# Patient Record
Sex: FEMALE | Race: BLACK OR AFRICAN AMERICAN | NOT HISPANIC OR LATINO
[De-identification: names, ages, dates, MRNs, and addresses within clinical notes are randomized per-mention and may not be internally consistent; named-entity substitution may affect disease eponyms.]

---

## 2018-07-09 PROBLEM — Z00.00 ENCOUNTER FOR PREVENTIVE HEALTH EXAMINATION: Status: ACTIVE | Noted: 2018-07-09

## 2018-07-13 ENCOUNTER — APPOINTMENT (OUTPATIENT)
Dept: OTOLARYNGOLOGY | Facility: CLINIC | Age: 23
End: 2018-07-13
Payer: COMMERCIAL

## 2018-07-13 VITALS
WEIGHT: 150 LBS | HEART RATE: 71 BPM | DIASTOLIC BLOOD PRESSURE: 70 MMHG | BODY MASS INDEX: 25.3 KG/M2 | SYSTOLIC BLOOD PRESSURE: 123 MMHG | HEIGHT: 64.5 IN

## 2018-07-13 DIAGNOSIS — J45.909 UNSPECIFIED ASTHMA, UNCOMPLICATED: ICD-10-CM

## 2018-07-13 DIAGNOSIS — J03.91 ACUTE RECURRENT TONSILLITIS, UNSPECIFIED: ICD-10-CM

## 2018-07-13 PROCEDURE — 99204 OFFICE O/P NEW MOD 45 MIN: CPT

## 2018-07-16 PROBLEM — J45.909 ASTHMA: Status: ACTIVE | Noted: 2018-07-13

## 2018-07-16 PROBLEM — J03.91 RECURRENT TONSILLITIS: Status: ACTIVE | Noted: 2018-07-16

## 2019-04-30 ENCOUNTER — APPOINTMENT (OUTPATIENT)
Dept: OTOLARYNGOLOGY | Facility: CLINIC | Age: 24
End: 2019-04-30
Payer: COMMERCIAL

## 2019-04-30 DIAGNOSIS — H61.22 IMPACTED CERUMEN, LEFT EAR: ICD-10-CM

## 2019-04-30 DIAGNOSIS — H92.02 OTALGIA, LEFT EAR: ICD-10-CM

## 2019-04-30 PROCEDURE — 69210 REMOVE IMPACTED EAR WAX UNI: CPT | Mod: LT

## 2019-04-30 PROCEDURE — 99214 OFFICE O/P EST MOD 30 MIN: CPT | Mod: 25

## 2019-04-30 NOTE — CONSULT LETTER
[Dear  ___] : Dear  [unfilled], [Courtesy Letter:] : I had the pleasure of seeing your patient, [unfilled], in my office today. [Consult Closing:] : Thank you very much for allowing me to participate in the care of this patient.  If you have any questions, please do not hesitate to contact me. [Sincerely,] : Sincerely, [FreeTextEntry3] : Lexx Lu MD\par Department of Otolaryngology - Head and Neck Surgery\par [facility]

## 2019-04-30 NOTE — ASSESSMENT
[FreeTextEntry1] : 23F here in followup. She thinks there is cerumen buildup and would like her ears checked and cleaned. There is no otorrhea, tinnitus or vertigo. She is also c/o pain around her left ear over the past few days.\par Since last seen 7/2018, she has done very well. She saw the allergist and testing revealed multiple allergies; she takes zyrtec as needed and has not had any further episodes of face/lip swelling. There have also been no further episodes of neck swelling/salivary stones and she has not had a tonsil infection either since last seen.\par On exam, mild cerumen was removed form the left EAC. The rest of the complete and comprehensive head and neck exam is unremarkable - the neck is soft, the TMJs are nontender,\par Unclear etiology to the left ear pain, but given normal exam, is likely either musculoskeletal or TMJ-related; pt was reassured either way. Recommend NSAIDs for pain. RTO 1 month should the pain persist/worsen; otherwise as needed.

## 2019-04-30 NOTE — HISTORY OF PRESENT ILLNESS
[de-identified] : 23F here in followup.\par \par She thinks there is cerumen buildup and would like her ears checked and cleaned. There is no otorrhea, tinnitus or vertigo. She is also c/o pain around her left ear over the past few days.\par Since last seen 7/2018, she has done very well. She saw the allergist and testing revealed multiple allergies; she takes zyrtec as needed and has not had any further episodes of face/lip swelling. There have also been no further episodes of neck swelling/salivary stones, and she has not had a tonsil infection either.\par \par ROS otherwise unremarkable.

## 2019-04-30 NOTE — PHYSICAL EXAM
[de-identified] : nontender over left TMJ and pushing left anterior EAC wall [de-identified] : no masses/lesions, no gross adenopathy; neck soft, supple [FreeTextEntry1] : Ad: ext ear wnl, eac clear and dry, TM intact and mobile, ME clear\par As: ext ear wnl, eac w cerumen along the floor removed w angled curet, TM intact and mobile, ME clear\par  [de-identified] : hypertrophic turbinates [Midline] : trachea located in midline position [de-identified] : FOM soft, submandibular ducts patent w clear saliva [de-identified] : 2-3+ cryptic tonsils; no stones or exudate [Normal] : orientation to person, place, and time: normal

## 2020-04-03 ENCOUNTER — APPOINTMENT (OUTPATIENT)
Dept: OTOLARYNGOLOGY | Facility: CLINIC | Age: 25
End: 2020-04-03

## 2020-07-14 ENCOUNTER — APPOINTMENT (OUTPATIENT)
Dept: OTOLARYNGOLOGY | Facility: CLINIC | Age: 25
End: 2020-07-14
Payer: MEDICAID

## 2020-07-14 VITALS
BODY MASS INDEX: 25.97 KG/M2 | TEMPERATURE: 98 F | DIASTOLIC BLOOD PRESSURE: 70 MMHG | WEIGHT: 154 LBS | HEIGHT: 64.5 IN | HEART RATE: 70 BPM | SYSTOLIC BLOOD PRESSURE: 119 MMHG

## 2020-07-14 DIAGNOSIS — H92.01 OTALGIA, RIGHT EAR: ICD-10-CM

## 2020-07-14 DIAGNOSIS — R07.0 PAIN IN THROAT: ICD-10-CM

## 2020-07-14 PROCEDURE — 31575 DIAGNOSTIC LARYNGOSCOPY: CPT

## 2020-07-14 PROCEDURE — 99214 OFFICE O/P EST MOD 30 MIN: CPT | Mod: 25

## 2020-07-14 NOTE — CONSULT LETTER
[Courtesy Letter:] : I had the pleasure of seeing your patient, [unfilled], in my office today. [Dear  ___] : Dear  [unfilled], [Consult Closing:] : Thank you very much for allowing me to participate in the care of this patient.  If you have any questions, please do not hesitate to contact me. [FreeTextEntry3] : Lexx Lu MD\par Department of Otolaryngology - Head and Neck Surgery\par [facility] [Sincerely,] : Sincerely,

## 2020-07-14 NOTE — PROCEDURE
[FreeTextEntry3] : Fiberoptic Laryngoscopy:\par small adenoid tuft\par upper airway widely patent\par TVF symmetric and mobile\par mild postcricoid edema\par no masses/lesions

## 2020-07-14 NOTE — HISTORY OF PRESENT ILLNESS
[de-identified] : 24F here in followup.\par \par She c/o right sided ear discomfort, in addition to nose/throat discomfort which she feels causes dryness and affects her swallowing. The nose/throat sx are only at night and prevent her from falling asleep; they are not present during the day. There is no otorrhea, tinnitus, vertigo, ear popping/pressure or hearing loss. When the ear discomfort started she was told it was due to wisdom teeth which she had extracted, but the pain persisted unchanged.\par There is no voice change, coughing, throat clearing; there is no difficulty eating, breathing, swallowing or talking.\par She takes occasional zyrtec for allergies; she eats spicy and fried foods.\par \par ROS otherwise unremarkable.

## 2020-07-14 NOTE — ASSESSMENT
[FreeTextEntry1] : 24F here in followup. She c/o right sided ear discomfort, in addition to nose/throat discomfort which she feels causes dryness and affects her swallowing. The nose/throat sx are only at night and prevent her from falling asleep; they are not present during the day. There is no otorrhea, tinnitus, vertigo, ear popping/pressure or hearing loss. When the ear discomfort started she was told it was due to wisdom teeth which she had extracted, but the pain persisted unchanged. There is no voice change, coughing, throat clearing; there is no difficulty eating, breathing, swallowing or talking. She takes occasional zyrtec for allergies; she eats spicy and fried foods.\par On exam, the left tonsil is larger than the right, but otherwise appears normal. There is also mild boggy postcricoid edema on laryngoscopy. The rest of the complete and comprehensive head and neck exam is unremarkable.\par Unclear etiology to the right ear pain and nose/throat sx. Regarding the ear, I would favor her pain to be either musculoskeletal or TMJ-related and recommend NSAIDs and conservative management. Regarding the nose/throat sx at night, this could be reflux related - as such, went over strict diet/lifestyle change. Will hold off on PPIs. RTO 2-3 months to reevaluate the asymptomatic left tonsillar asymmetry. All questions answered.

## 2020-07-14 NOTE — PHYSICAL EXAM
[de-identified] : no masses/lesions, no gross adenopathy; neck soft, supple [de-identified] : nontender over left TMJ and pushing left anterior EAC wall [de-identified] : hypertrophic turbinates [Laryngoscopy Performed] : laryngoscopy was performed, see procedure section for findings [Midline] : trachea located in midline position [de-identified] : 2+ cryptic tonsils; left tonsil slightly larger w extension inferomedially, mucosalized and cryptic, no stones or exudate [de-identified] : FOM soft, submandibular ducts patent w clear saliva [Normal] : no rashes [FreeTextEntry1] : Ad: ext ear wnl, eac clear and dry, TM intact and mobile, ME clear\par As: ext ear wnl, eac w cerumen along the floor removed w angled curet, TM intact and mobile, ME clear\par

## 2020-09-15 ENCOUNTER — APPOINTMENT (OUTPATIENT)
Dept: OTOLARYNGOLOGY | Facility: CLINIC | Age: 25
End: 2020-09-15

## 2021-11-24 ENCOUNTER — APPOINTMENT (OUTPATIENT)
Dept: NEUROLOGY | Facility: CLINIC | Age: 26
End: 2021-11-24
Payer: MEDICAID

## 2021-11-24 ENCOUNTER — NON-APPOINTMENT (OUTPATIENT)
Age: 26
End: 2021-11-24

## 2021-11-24 VITALS
TEMPERATURE: 97.9 F | DIASTOLIC BLOOD PRESSURE: 79 MMHG | BODY MASS INDEX: 25.3 KG/M2 | HEART RATE: 68 BPM | HEIGHT: 64.5 IN | RESPIRATION RATE: 16 BRPM | SYSTOLIC BLOOD PRESSURE: 126 MMHG | WEIGHT: 150 LBS | OXYGEN SATURATION: 99 %

## 2021-11-24 DIAGNOSIS — M26.609 UNSPECIFIED TEMPOROMANDIBULAR JOINT DISORDER: ICD-10-CM

## 2021-11-24 DIAGNOSIS — G43.909 MIGRAINE, UNSPECIFIED, NOT INTRACTABLE, W/OUT STATUS MIGRAINOSUS: ICD-10-CM

## 2021-11-24 PROCEDURE — 99203 OFFICE O/P NEW LOW 30 MIN: CPT

## 2021-11-24 RX ORDER — ALBUTEROL 90 MCG
AEROSOL (GRAM) INHALATION
Refills: 0 | Status: ACTIVE | COMMUNITY

## 2021-11-24 NOTE — ASSESSMENT
[FreeTextEntry1] : Pt is 26yoF with PMH asthma here for initial visit for daily migraines and intermittent R facial numbness in the setting of severe R sided TMJ which has been flaring x8 months. My sense is that migraines are being triggered by her TMJ, and numbness is 2/2 muscle spasms from her TMJ. However given that facial numbness is a new feature for her, still need to r/o any intracranial pathology that may be contributing (although my suspicion for this is low). \par \par Plan: \par MRI head\par Pt will send previous brain imaging that was done 2 years ago \par She will send recent BW results\par WIll start her on nortriptiline for both migraine PPx as well as TMJ treatment. Discussed possible SEs to look out for and to noitify me if any. \par Will rx ubrelvy for severe migraine breakthorugh. Advised to take at start of her headache. \par Continue to monitor headaches, advised to keep a headache diary to better understand headache patterns. This should include details such as alleviating and precipitating factors, associated symptoms, frequency and severity, etc. Advised patient to avoid OTC (Tylenol/ Motrin) overuse as this can cause rebound migraines. \par \par F/u with attending in 2 months

## 2021-11-24 NOTE — HISTORY OF PRESENT ILLNESS
[FreeTextEntry1] : Pt is a 26yoF with PMH asthma here for initial evaluation of headaches. \par \par C/o constant daily headaches x 8 months. These headaches wax and wane, but are constant. She describes pain as aching and pressure, always localized to R side of her head. Headaches become severe every few days or so, reaching 10/10 on pain scale. She has tried Tylenol, Motrin, other OTCs with no relief. \par Endorsed hx of migraines which began as a teenager, these were associated with her periods/ hormone changes. \par Had extensive brain imaging ordered by outside neurologist when she was initially diagnosed with TMJ 2 years ago- all reportedly normal. Has been following up with multiple ENTs for severe R-sided TMJ pain. Has tried mouth guard with no relief. Has seen an oral surgeon and had wisdom teeth removed with no improvement in her pain. Has tried mouth guard, also with no relief. Was told she needs to followup with a maxillofacial surgeon which she has not yet done. She also reports new intermittent R sided facial numbness, also x8 months. She says this correlates with severe TMJ pain and not necessarily with her severe headaches. This numbness is a new feature for her. Denies any currently. She also denies any other specific neurological complaints including dizziness, tinnitus, vision changes, speech impairment, focal or generalized weakness/ gait disturbance. \par  \par Takes albuterol for asthma, not on any other medications including birth control or any supplements \par She denies pregnancy\par She denies personal or family history of any bleeding or clotting disorders.\par Denies hx of miscarriage. \par Denies hx of any eating disorders\par Denies hx of anxiety/ depression/ other psychiatric disorders (although she says she is under a fair amount of stress from work). \par She denies any cardiac history \par Denies hx smoking/ ETOH/ recreational drug use \par Had BW with PCP a few weeks ago, which was reportedly normal

## 2021-12-01 RX ORDER — UBROGEPANT 50 MG/1
50 TABLET ORAL
Qty: 16 | Refills: 0 | Status: DISCONTINUED | COMMUNITY
Start: 2021-11-24 | End: 2021-12-01

## 2021-12-12 ENCOUNTER — APPOINTMENT (OUTPATIENT)
Dept: MRI IMAGING | Facility: HOSPITAL | Age: 26
End: 2021-12-12

## 2021-12-12 ENCOUNTER — OUTPATIENT (OUTPATIENT)
Dept: OUTPATIENT SERVICES | Facility: HOSPITAL | Age: 26
LOS: 1 days | End: 2021-12-12
Payer: MEDICAID

## 2021-12-12 PROCEDURE — 70551 MRI BRAIN STEM W/O DYE: CPT | Mod: 26

## 2021-12-12 PROCEDURE — 70551 MRI BRAIN STEM W/O DYE: CPT

## 2021-12-13 ENCOUNTER — NON-APPOINTMENT (OUTPATIENT)
Age: 26
End: 2021-12-13

## 2021-12-14 ENCOUNTER — NON-APPOINTMENT (OUTPATIENT)
Age: 26
End: 2021-12-14

## 2021-12-19 ENCOUNTER — NON-APPOINTMENT (OUTPATIENT)
Age: 26
End: 2021-12-19

## 2022-07-22 ENCOUNTER — APPOINTMENT (OUTPATIENT)
Dept: OTOLARYNGOLOGY | Facility: CLINIC | Age: 27
End: 2022-07-22

## 2022-07-22 VITALS
BODY MASS INDEX: 26.14 KG/M2 | HEART RATE: 83 BPM | DIASTOLIC BLOOD PRESSURE: 95 MMHG | WEIGHT: 155 LBS | HEIGHT: 64.5 IN | SYSTOLIC BLOOD PRESSURE: 119 MMHG | TEMPERATURE: 97.8 F

## 2022-07-22 DIAGNOSIS — R22.1 LOCALIZED SWELLING, MASS AND LUMP, HEAD: ICD-10-CM

## 2022-07-22 DIAGNOSIS — R22.0 LOCALIZED SWELLING, MASS AND LUMP, HEAD: ICD-10-CM

## 2022-07-22 PROCEDURE — 99214 OFFICE O/P EST MOD 30 MIN: CPT

## 2022-07-22 NOTE — ASSESSMENT
[FreeTextEntry1] : 26F here in followup. Over the past year or so, she c/o recurrent episodes of swelling in her neck below her right jaw. The swelling can fluctuate. There is also right ear pain which can travel down her jaw and is more pronounced when her neck is swollen. There is no difficulty eating, breathing, swallowing or talking. Of note, she has h/o salivary stones which had presented as lump below her tongue a few years ago. She had been seen in the past for reflux, TMJ and tonsillar hypertrophy.\par On exam, there is an enlarged right SMG, nontender. There is pain on palpation over the right TMJ, ramus and anterior EAC wall. Submandibular ducts are patent w clear saliva. Tonsils are cryptic and the left tonsil is slightly larger w a small left stone. The rest of the head and neck exam is unremarkable.\par -Enlarged right SMG w h/o sialolithiasis -> will send for CT neck to evaluate for presence of stones/mass/lesion. Hydration, massage.\par -TMJ/myofascial pain -> soft diet, NSAIDs, \par RTO after CT for review.

## 2022-07-22 NOTE — HISTORY OF PRESENT ILLNESS
[de-identified] : 26F here in followup.\par \par Over the past year or so, she c/o recurrent episodes of swelling in her neck below her right jaw. The swelling can fluctuate. There is also right ear pain which can travel down her jaw and is more pronounced when her neck is swollen. There is no difficulty eating, breathing, swallowing or talking.\par Of note, she has h/o salivary stones which had presented as lump below her tongue a few years ago. \par She had been seen in the past for reflux, TMJ and tonsillar hypertrophy.\par \par ROS otherwise unremarkable.

## 2022-07-22 NOTE — PHYSICAL EXAM
[de-identified] : mildly enlarged right SMG, nontender. [de-identified] : +pain over right TMJ, ramus and anterior EAC wall [de-identified] : hypertrophic turbinates [Midline] : trachea located in midline position [Laryngoscopy Performed] : laryngoscopy was performed, see procedure section for findings [de-identified] : FOM soft, submandibular ducts patent w clear saliva [de-identified] : 2+ cryptic tonsils; left tonsil slightly larger w extension inferomedially, mucosalized and cryptic, small left stone [Normal] : no rashes

## 2022-07-31 ENCOUNTER — APPOINTMENT (OUTPATIENT)
Dept: CT IMAGING | Facility: HOSPITAL | Age: 27
End: 2022-07-31

## 2022-07-31 ENCOUNTER — OUTPATIENT (OUTPATIENT)
Dept: OUTPATIENT SERVICES | Facility: HOSPITAL | Age: 27
LOS: 1 days | End: 2022-07-31
Payer: MEDICAID

## 2022-07-31 PROCEDURE — 70491 CT SOFT TISSUE NECK W/DYE: CPT | Mod: 26

## 2022-07-31 PROCEDURE — 70491 CT SOFT TISSUE NECK W/DYE: CPT

## 2022-08-16 ENCOUNTER — APPOINTMENT (OUTPATIENT)
Dept: OTOLARYNGOLOGY | Facility: CLINIC | Age: 27
End: 2022-08-16

## 2022-08-16 DIAGNOSIS — K11.5 SIALOLITHIASIS: ICD-10-CM

## 2022-08-16 PROCEDURE — 99213 OFFICE O/P EST LOW 20 MIN: CPT | Mod: 25

## 2022-08-16 PROCEDURE — 42330 REMOVAL OF SALIVARY STONE: CPT

## 2022-08-16 NOTE — PHYSICAL EXAM
[Midline] : trachea located in midline position [Laryngoscopy Performed] : laryngoscopy was performed, see procedure section for findings [Normal] : no rashes [de-identified] : mildly enlarged right SMG, nontender. [de-identified] : +pain over right TMJ, ramus and anterior EAC wall [de-identified] : hypertrophic turbinates [de-identified] : FOM soft, no grossly palpable stone, left SMD patent [de-identified] : 2+ cryptic tonsils; left tonsil slightly larger w extension inferomedially, mucosalized and cryptic, small left stone

## 2022-08-16 NOTE — HISTORY OF PRESENT ILLNESS
[de-identified] : 26F here in followup.\par \par Over the past year or so, she c/o recurrent episodes of swelling in her neck below her right jaw. The swelling can fluctuate. There is also right ear pain which can travel down her jaw and is more pronounced when her neck is swollen. There is no difficulty eating, breathing, swallowing or talking.\par Of note, she has h/o salivary stones which had presented as lump below her tongue a few years ago. \par She had been seen in the past for reflux, TMJ and tonsillar hypertrophy.\par \par CT Neck w/o contrast 7/31/22:\par -5 mm calculus is identified in the distal right submandibular duct\par \par ROS otherwise unremarkable.

## 2022-08-16 NOTE — ASSESSMENT
[FreeTextEntry1] : 26F here in followup. Over the past year or so, she c/o recurrent episodes of swelling in her neck below her right jaw. The swelling can fluctuate. There is also right ear pain which can travel down her jaw and is more pronounced when her neck is swollen. There is no difficulty eating, breathing, swallowing or talking. Of note, she has h/o salivary stones which had presented as lump below her tongue a few years ago. CT neck demonstrates a 5mm calculus in the distal right submandibular duct. On exam, the floor of mouth is soft; there is no gross saliva on pressure from the right SMD. The right distal SMD was marsupialized into the oral cavity without issue w egress of clear saliva.\par Right submandibular sialolithiasis now s/p in office right SMD sialodochoplasty. No stone was extracted, but the duct was opened widely and hopefully will help. RTO 3-4 weeks in routine followup. If sx persist, will need to go to OR.\par Will also send to Dr. Carlos for TMJ evaluation; she reports she was scheduled for jaw arthroscopy? but would like second opinion.

## 2022-08-16 NOTE — PROCEDURE
[FreeTextEntry3] : Sialodochoplasty:\par floor of mouth infiltrated with 1% lido w epi\par right submandibular duct cannulated w lacrimal probes from 000 to 4\par distal 7mm of ductal marsupialized w 11blade and scissors --> egress of clear saliva on massage\par no stone extracted or palpated\par tolerated well

## 2022-08-23 ENCOUNTER — APPOINTMENT (OUTPATIENT)
Dept: OTOLARYNGOLOGY | Facility: CLINIC | Age: 27
End: 2022-08-23

## 2022-08-23 VITALS
DIASTOLIC BLOOD PRESSURE: 81 MMHG | SYSTOLIC BLOOD PRESSURE: 115 MMHG | WEIGHT: 155 LBS | TEMPERATURE: 98.2 F | BODY MASS INDEX: 25.83 KG/M2 | HEIGHT: 65 IN | HEART RATE: 78 BPM

## 2022-08-23 PROCEDURE — 99214 OFFICE O/P EST MOD 30 MIN: CPT | Mod: 25

## 2022-08-23 NOTE — PHYSICAL EXAM
[de-identified] : Right-sided mild neck pain in the submandibular fossa and floor for floor of mouth bimanual palpation reveals not only the palpable stone but some pain in the area [de-identified] : As per HPI [de-identified] : Temporomandibular joint exam reveals no significant deviation on opening no significant pain to palpation of the joint capsule on either side no significant clicking and popping she does have some very mild asymmetry when opening and significant tension in the masseter muscle on the right-hand side with pain on palpation of the masseter anterior belly.  Temporalis muscle relatively normal bilaterally on exam. [de-identified] : No evidence of effusion or drainage [de-identified] : Floor mouth anteriorly there is a very small amount of inflammation of the opening of Lynn's duct [de-identified] : Occlusion intact no evidence of right-sided open bite [Normal] : orientation to person, place, and time: normal

## 2022-08-23 NOTE — DATA REVIEWED
[de-identified] : I have reviewed her CT scan and I can see the visible stone on the right side within the duct the remainder of the submandibular gland appears normal the remainder of the CT appears normal I evaluated the osseous structures of the TMJs bilaterally and they are relatively normal with minimal differences between the sides.  I also reviewed her MRI scan and while she does appear to have very mild disc changes on the right with some evidence of lack of recapture in the disc certainly there is not severe disease severe swelling inflammation or evidence of disc perforation on the scan.  No other soft tissue abnormalities noted.

## 2022-08-23 NOTE — HISTORY OF PRESENT ILLNESS
[de-identified] : 26F presents with right sided facial/jaw pain for several years. Also reports swollen lymph nodes. \par Referred by Dr. Lu for sinus issues and had CT Neck done which showed a calculus in submandibular gland.  Patient reports a several month history of right-sided facial pain she is never had pain on the left she has CT which was performed which reveals a approximately 4 to 6 mm stone in the right Stensen's duct somewhat anteriorly.  In addition she was evaluated for possible TMJ disease on the right-hand side due to some facial pain and under went an MRI of the TMJ which showed some possible trapping of the meniscus on the right disc. [FreeTextEntry1] : She reports no fever chills nausea vomiting or significant cellulitic changes in the neck

## 2022-09-06 ENCOUNTER — TRANSCRIPTION ENCOUNTER (OUTPATIENT)
Age: 27
End: 2022-09-06

## 2022-09-06 VITALS
DIASTOLIC BLOOD PRESSURE: 79 MMHG | OXYGEN SATURATION: 100 % | WEIGHT: 157.19 LBS | HEART RATE: 76 BPM | HEIGHT: 65 IN | RESPIRATION RATE: 16 BRPM | SYSTOLIC BLOOD PRESSURE: 113 MMHG | TEMPERATURE: 98 F

## 2022-09-06 NOTE — PRE-OP CHECKLIST - SELECT TESTS ORDERED
CBC/CMP/PT/PTT/INR/Urinalysis/HCG/COVID-19 Preg test negative-DOS/CBC/CMP/PT/PTT/INR/Urinalysis/HCG/COVID-19 Double O-Z Plasty Text: The defect edges were debeveled with a #15 scalpel blade.  Given the location of the defect, shape of the defect and the proximity to free margins a Double O-Z plasty (double transposition flap) was deemed most appropriate.  Using a sterile surgical marker, the appropriate transposition flaps were drawn incorporating the defect and placing the expected incisions within the relaxed skin tension lines where possible. The area thus outlined was incised deep to adipose tissue with a #15 scalpel blade.  The skin margins were undermined to an appropriate distance in all directions utilizing iris scissors.  Hemostasis was achieved with electrocautery.  The flaps were then transposed into place, one clockwise and the other counterclockwise, and anchored with interrupted buried subcutaneous sutures.

## 2022-09-06 NOTE — ASU PATIENT PROFILE, ADULT - NSICDXPASTMEDICALHX_GEN_ALL_CORE_FT
PAST MEDICAL HISTORY:  Asthma     Atopic dermatitis     Iron deficiency anemia     Salivary calculus

## 2022-09-06 NOTE — ASU PATIENT PROFILE, ADULT - FALL HARM RISK - UNIVERSAL INTERVENTIONS
Bed in lowest position, wheels locked, appropriate side rails in place/Call bell, personal items and telephone in reach/Instruct patient to call for assistance before getting out of bed or chair/Non-slip footwear when patient is out of bed/[place] to call system/Physically safe environment - no spills, clutter or unnecessary equipment/Purposeful Proactive Rounding/Room/bathroom lighting operational, light cord in reach

## 2022-09-06 NOTE — ASU PATIENT PROFILE, ADULT - NSICDXPASTSURGICALHX_GEN_ALL_CORE_FT
PAST SURGICAL HISTORY:  Elective surgery removal of salivary calculus     PAST SURGICAL HISTORY:  H/O endoscopy

## 2022-09-07 ENCOUNTER — APPOINTMENT (OUTPATIENT)
Dept: OTOLARYNGOLOGY | Facility: HOSPITAL | Age: 27
End: 2022-09-07

## 2022-09-07 ENCOUNTER — TRANSCRIPTION ENCOUNTER (OUTPATIENT)
Age: 27
End: 2022-09-07

## 2022-09-07 ENCOUNTER — RESULT REVIEW (OUTPATIENT)
Age: 27
End: 2022-09-07

## 2022-09-07 ENCOUNTER — OUTPATIENT (OUTPATIENT)
Dept: OUTPATIENT SERVICES | Facility: HOSPITAL | Age: 27
LOS: 1 days | Discharge: HOME | End: 2022-09-07
Payer: MEDICAID

## 2022-09-07 VITALS
SYSTOLIC BLOOD PRESSURE: 124 MMHG | OXYGEN SATURATION: 100 % | DIASTOLIC BLOOD PRESSURE: 69 MMHG | RESPIRATION RATE: 18 BRPM | HEART RATE: 58 BPM

## 2022-09-07 DIAGNOSIS — Z41.9 ENCOUNTER FOR PROCEDURE FOR PURPOSES OTHER THAN REMEDYING HEALTH STATE, UNSPECIFIED: Chronic | ICD-10-CM

## 2022-09-07 DIAGNOSIS — Z98.890 OTHER SPECIFIED POSTPROCEDURAL STATES: Chronic | ICD-10-CM

## 2022-09-07 PROCEDURE — 42330 REMOVAL OF SALIVARY STONE: CPT

## 2022-09-07 PROCEDURE — 88300 SURGICAL PATH GROSS: CPT | Mod: 26

## 2022-09-07 PROCEDURE — 88300 SURGICAL PATH GROSS: CPT

## 2022-09-07 RX ORDER — KETOROLAC TROMETHAMINE 30 MG/ML
30 SYRINGE (ML) INJECTION ONCE
Refills: 0 | Status: DISCONTINUED | OUTPATIENT
Start: 2022-09-07 | End: 2022-09-07

## 2022-09-07 RX ORDER — EPINEPHRINE 0.3 MG/.3ML
0 INJECTION INTRAMUSCULAR; SUBCUTANEOUS
Qty: 0 | Refills: 0 | DISCHARGE

## 2022-09-07 RX ORDER — ALBUTEROL 90 UG/1
2 AEROSOL, METERED ORAL
Qty: 0 | Refills: 0 | DISCHARGE

## 2022-09-07 RX ORDER — CHLORHEXIDINE GLUCONATE 213 G/1000ML
15 SOLUTION TOPICAL
Qty: 315 | Refills: 0
Start: 2022-09-07 | End: 2022-09-13

## 2022-09-07 RX ORDER — SODIUM CHLORIDE 9 MG/ML
1000 INJECTION, SOLUTION INTRAVENOUS
Refills: 0 | Status: DISCONTINUED | OUTPATIENT
Start: 2022-09-07 | End: 2022-09-07

## 2022-09-07 RX ADMIN — Medication 30 MILLIGRAM(S): at 09:19

## 2022-09-07 NOTE — ASU DISCHARGE PLAN (ADULT/PEDIATRIC) - CARE PROVIDER_API CALL
Herbert Carlos (MD; DDS)  [facility]; OralMaxillofacial Surgery; Otolaryngology  [facility] - Dept of Otolaryngology, [address], [place] [address]  Phone: [phone]  Fax: [phone]  Established Patient  Follow Up Time:

## 2022-09-07 NOTE — BRIEF OPERATIVE NOTE - OPERATION/FINDINGS
Intraoral excision of R submandibular stone in distal duct. Submandibular duct marsupialized in FOM.

## 2022-09-07 NOTE — ASU DISCHARGE PLAN (ADULT/PEDIATRIC) - NS MD DC FALL RISK RISK
For information on Fall & Injury Prevention, visit: [email or web address] OR  [email or web address] OR  https://www.cdc.gov/steadi/patient.html

## 2022-09-12 PROBLEM — K11.5 SIALOLITHIASIS: Chronic | Status: ACTIVE | Noted: 2022-09-06

## 2022-09-12 PROBLEM — J45.909 UNSPECIFIED ASTHMA, UNCOMPLICATED: Chronic | Status: ACTIVE | Noted: 2022-09-06

## 2022-09-12 PROBLEM — L20.9 ATOPIC DERMATITIS, UNSPECIFIED: Chronic | Status: ACTIVE | Noted: 2022-09-06

## 2022-09-12 PROBLEM — D50.9 IRON DEFICIENCY ANEMIA, UNSPECIFIED: Chronic | Status: ACTIVE | Noted: 2022-09-06

## 2022-09-13 ENCOUNTER — APPOINTMENT (OUTPATIENT)
Dept: OTOLARYNGOLOGY | Facility: CLINIC | Age: 27
End: 2022-09-13

## 2022-09-14 LAB — SURGICAL PATHOLOGY STUDY: SIGNIFICANT CHANGE UP

## 2022-09-20 ENCOUNTER — APPOINTMENT (OUTPATIENT)
Dept: OTOLARYNGOLOGY | Facility: CLINIC | Age: 27
End: 2022-09-20

## 2022-09-20 VITALS
DIASTOLIC BLOOD PRESSURE: 90 MMHG | HEIGHT: 65 IN | WEIGHT: 155 LBS | BODY MASS INDEX: 25.83 KG/M2 | HEART RATE: 84 BPM | SYSTOLIC BLOOD PRESSURE: 129 MMHG | TEMPERATURE: 97.8 F

## 2022-09-20 PROCEDURE — 99024 POSTOP FOLLOW-UP VISIT: CPT

## 2022-10-16 ENCOUNTER — OUTPATIENT (OUTPATIENT)
Dept: OUTPATIENT SERVICES | Facility: HOSPITAL | Age: 27
LOS: 1 days | End: 2022-10-16
Payer: MEDICAID

## 2022-10-16 ENCOUNTER — APPOINTMENT (OUTPATIENT)
Dept: MRI IMAGING | Facility: HOSPITAL | Age: 27
End: 2022-10-16

## 2022-10-16 DIAGNOSIS — Z98.890 OTHER SPECIFIED POSTPROCEDURAL STATES: Chronic | ICD-10-CM

## 2022-10-16 PROCEDURE — 70543 MRI ORBT/FAC/NCK W/O &W/DYE: CPT | Mod: 26

## 2022-10-16 PROCEDURE — 70543 MRI ORBT/FAC/NCK W/O &W/DYE: CPT

## 2022-10-16 PROCEDURE — A9585: CPT

## 2022-12-06 ENCOUNTER — APPOINTMENT (OUTPATIENT)
Dept: OTOLARYNGOLOGY | Facility: CLINIC | Age: 27
End: 2022-12-06

## 2022-12-06 VITALS
BODY MASS INDEX: 25.83 KG/M2 | SYSTOLIC BLOOD PRESSURE: 109 MMHG | HEIGHT: 65 IN | DIASTOLIC BLOOD PRESSURE: 67 MMHG | WEIGHT: 155 LBS | TEMPERATURE: 97.7 F | HEART RATE: 80 BPM

## 2022-12-06 PROCEDURE — 99213 OFFICE O/P EST LOW 20 MIN: CPT

## 2022-12-08 NOTE — HISTORY OF PRESENT ILLNESS
[de-identified] : Patient presents for evaluation and a discussion regarding her right sided facial pain. She recently underwent MRI, and I have reviewed her MRI, and I do not see any lesions or suspicious findings which would explain his facial pain.  The pain seems to be centered on the right side primarily in the distribution of the trigeminal nerve including right ear canal right cheek and temple area, right mandibular teeth.  But no clear anatomic reason for this she said the pain for several years.

## 2022-12-08 NOTE — PHYSICAL EXAM
[de-identified] : Right-sided mild neck pain in the submandibular fossa and floor for floor of mouth bimanual palpation reveals not only the palpable stone but some pain in the area [de-identified] : As per HPI [de-identified] : Temporomandibular joint exam reveals no significant deviation on opening no significant pain to palpation of the joint capsule on either side no significant clicking and popping she does have some very mild asymmetry when opening and significant tension in the masseter muscle on the right-hand side with pain on palpation of the masseter anterior belly.  Temporalis muscle relatively normal bilaterally on exam. [de-identified] : No evidence of effusion or drainage [de-identified] : Occlusion intact no evidence of right-sided open bite [Normal] : orientation to person, place, and time: normal

## 2022-12-21 ENCOUNTER — APPOINTMENT (OUTPATIENT)
Dept: SPINE | Facility: CLINIC | Age: 27
End: 2022-12-21

## 2022-12-21 DIAGNOSIS — R51.9 HEADACHE, UNSPECIFIED: ICD-10-CM

## 2022-12-21 DIAGNOSIS — Z87.09 PERSONAL HISTORY OF OTHER DISEASES OF THE RESPIRATORY SYSTEM: ICD-10-CM

## 2022-12-21 PROCEDURE — 99203 OFFICE O/P NEW LOW 30 MIN: CPT | Mod: 95

## 2022-12-22 PROBLEM — R51.9 RIGHT FACIAL PAIN: Status: ACTIVE | Noted: 2022-12-22

## 2022-12-22 PROBLEM — Z87.09 HISTORY OF ASTHMA: Status: RESOLVED | Noted: 2022-12-22 | Resolved: 2022-12-22

## 2022-12-22 NOTE — PHYSICAL EXAM
[General Appearance - Alert] : alert [General Appearance - Well Nourished] : well nourished [General Appearance - Well-Appearing] : healthy appearing [Oriented To Time, Place, And Person] : oriented to person, place, and time [Person] : oriented to person [Place] : oriented to place [Time] : oriented to time [] : no respiratory distress

## 2023-01-05 NOTE — ADDENDUM
[FreeTextEntry1] : 2022\par \par Herbert Carlos M.D.\[facility]\par Department of Otolaryngology\par [address] [place] [address]\par [address]\[place] [address]\par \par Re:	Gail Maria \par :	[birth year]\par \par Dear Herbert,\par \par I saw Gail Maria in neurosurgical consultation.  As you know, she was having a multi-year history of pain on the right side of her face.  She underwent a salivary gland resection due to a stone but did not achieve any change in her symptoms.  She has pain that she describes in the temporal region, up about her eye, second division down to the third division as well.  She stated that for the past several years, when it began, it came on acutely.  There was no particular injury.  She was started on Tegretol but did not tolerate it.  It is unclear that any medication really solves her problem.  \par \par It does not sound like trigeminal neuralgia to me.  There is no specific inciting factor.  The pain is constant and involves all three divisions of the nerve.  \par \par I discussed this with her, since this was a telemedicine visit, no exam was done, but I explained to her that all of the procedures that I would be doing are destructive.  I do not think that there is a clinical basis to perform a destructive procedure on her, which would result in numbness and possibly create a worse situation for her.  I do not think it is unreasonable to try a trigeminal block temporarily but I do not think it would provide longstanding relief.  \par \par \par \par \par \par \par Unfortunately, I do not have much to add in this unfortunate case of facial pain of undetermined etiology.  I think further exploration of medical management, different combinations of medications, and other nondestructive treatments would be appropriate. \par \par \par \par \par Thank you very much for allowing me to see her.\par \par Sincerely,\par \par \par  \par Huber Hunt MD\par [facility] #[id number]\par \par \par

## 2023-01-05 NOTE — CONSULT LETTER
[FreeTextEntry2] : Herbert Carlos MD\par Dept of Otolaryngology, \par [address]\par  [address], [place] [address]

## 2023-01-05 NOTE — DATA REVIEWED
[de-identified] : ACC: [id number] EXAM: MR ORBIT FACE AND ORNECK WAWIC\par \par PROCEDURE DATE: 10/16/2022\par \par \par \par INTERPRETATION: The examination consists of sagittal, axial and coronal images through the brain obtained both before and following intravenous contrast administration, with particular attention to the course of cranial nerves III through VI. Series include post-contrast volumetric FLAIR and T1-weighted sequences, as well as heavily T2 weighted sequences.\par \par Clinical information: Right-sided atypical facial pain. Evaluate trigeminal nerve.\par \par The study is intermittently degraded by motion artifact and limited by suboptimal positioning.\par \par There is no evidence of mass or pathologic contrast enhancement along the course of the right trigeminal nerve from the prince through the cavernous sinuses/Meckel's caves through the facial soft tissues. There is no skull base mass. No neurovascular contact is identified within the cisternal segment. Included portions of the cervical spinal cord are unremarkable.\par \par The paranasal sinuses are fully ventilated bilaterally. The mastoid air cells are clear. The parotid glands are normal. There is mild prominence of adenoidal and left greater than right palatine tonsillar tissue without invasive features. The orbital contents are normal. Ventricles, sulci and cisterns are normal in caliber for the patient's age. Normal signal is identified in the brain parenchyma without intracranial mass or pathologic contrast enhancement.\par \par IMPRESSION:\par \par Exam is limited by motion and suboptimal positioning, however no etiology is identified for reported atypical right facial pain.\par \par --- End of Report ---\par \par \par \par \par \par JIMMIE PATEL MD; Attending Radiologist\par This document has been electronically signed. Oct 19 2022 3:29PM\par Notes\par

## 2023-01-05 NOTE — HISTORY OF PRESENT ILLNESS
[Home] : at home, [unfilled] , at the time of the visit. [Medical Office: ([facility])___] : at the medical office located in  [Verbal consent obtained from patient] : the patient, [unfilled] [de-identified] : This is a 28 y/o FEMALE who recently underwent the removal a RIGHT submandibular gland stone on 9/7/2022 for right sided facial pain but has continued  despite this procedure\par \par PLEASE REFER TO ADDENDUM FOR COMPLETE HPI

## 2023-01-06 ENCOUNTER — NON-APPOINTMENT (OUTPATIENT)
Age: 28
End: 2023-01-06

## 2023-01-06 ENCOUNTER — APPOINTMENT (OUTPATIENT)
Dept: NEUROLOGY | Facility: CLINIC | Age: 28
End: 2023-01-06
Payer: MEDICAID

## 2023-01-06 VITALS
HEART RATE: 76 BPM | TEMPERATURE: 97.9 F | OXYGEN SATURATION: 99 % | WEIGHT: 155 LBS | DIASTOLIC BLOOD PRESSURE: 84 MMHG | BODY MASS INDEX: 25.83 KG/M2 | SYSTOLIC BLOOD PRESSURE: 123 MMHG | HEIGHT: 65 IN

## 2023-01-06 PROCEDURE — 99214 OFFICE O/P EST MOD 30 MIN: CPT

## 2023-01-06 RX ORDER — RIMEGEPANT SULFATE 75 MG/75MG
75 TABLET, ORALLY DISINTEGRATING ORAL
Qty: 16 | Refills: 2 | Status: DISCONTINUED | COMMUNITY
Start: 2021-12-01 | End: 2023-01-06

## 2023-01-06 RX ORDER — NORTRIPTYLINE HYDROCHLORIDE 10 MG/1
10 CAPSULE ORAL AT BEDTIME
Qty: 50 | Refills: 0 | Status: DISCONTINUED | COMMUNITY
Start: 2021-11-24 | End: 2023-01-06

## 2023-01-09 NOTE — ASSESSMENT
[FreeTextEntry1] : Symptoms not consistent with trigeminal neuralgia, migraine, or tension headache\par She has seen multiple physicians of varying specialties and tried multiple treatments without improvement\par I recommended she see a facial pain specialist for evaluation and continued treatment- given referral\par \par

## 2023-01-09 NOTE — PHYSICAL EXAM
[FreeTextEntry1] : CN 2-12 normal\par Motor: 5/5 strength throughout\par Reflexes 2+ symmetric throughout\par Gait: normal

## 2023-01-09 NOTE — HISTORY OF PRESENT ILLNESS
[FreeTextEntry1] : 27yrs referred by Dr. Carlos for right facial pain\par \par She reported that she started experiencing symptoms since 2019. \par sudden onset, feels like dull and pressure, soreness to the area\par right face and sometime right posterior head feels warm , loud sounds are muffled \par associated fullness of the ear, jaw pain and tooth \par 10/10 and 8/10 \par tried tylenol, advil, \par seen a neurologist who thought it was trigeminal neuralgia however, prescribed carbamazepine which exacerbated symptoms\par She had botox for TMJ which did not help \par She also was given ubrelvy and nortriptyline which did not help \par \par Reviewed:\par Notes from neurology, neurosurgery, ENT\par MRI head and MRI face / orbits - normal

## 2023-06-05 ENCOUNTER — APPOINTMENT (OUTPATIENT)
Dept: NEUROLOGY | Facility: CLINIC | Age: 28
End: 2023-06-05

## 2023-08-27 ENCOUNTER — RESULT REVIEW (OUTPATIENT)
Age: 28
End: 2023-08-27

## 2023-08-27 ENCOUNTER — APPOINTMENT (OUTPATIENT)
Dept: MRI IMAGING | Facility: HOSPITAL | Age: 28
End: 2023-08-27

## 2023-08-27 ENCOUNTER — OUTPATIENT (OUTPATIENT)
Dept: OUTPATIENT SERVICES | Facility: HOSPITAL | Age: 28
LOS: 1 days | End: 2023-08-27
Payer: MEDICAID

## 2023-08-27 DIAGNOSIS — Z98.890 OTHER SPECIFIED POSTPROCEDURAL STATES: Chronic | ICD-10-CM

## 2023-08-27 PROCEDURE — A9585: CPT

## 2023-08-27 PROCEDURE — 70543 MRI ORBT/FAC/NCK W/O &W/DYE: CPT

## 2023-08-27 PROCEDURE — 70543 MRI ORBT/FAC/NCK W/O &W/DYE: CPT | Mod: 26

## 2024-01-04 NOTE — DATA REVIEWED
Pt attempting ice chips to encourage PO intake and PO med admin.    [No studies available for review at this time] : No studies available for review at this time

## 2024-01-22 ENCOUNTER — APPOINTMENT (OUTPATIENT)
Dept: NEUROSURGERY | Facility: CLINIC | Age: 29
End: 2024-01-22

## 2024-04-15 ENCOUNTER — APPOINTMENT (OUTPATIENT)
Dept: NEUROSURGERY | Facility: CLINIC | Age: 29
End: 2024-04-15
Payer: COMMERCIAL

## 2024-04-15 VITALS
HEART RATE: 77 BPM | OXYGEN SATURATION: 98 % | TEMPERATURE: 97.6 F | SYSTOLIC BLOOD PRESSURE: 119 MMHG | BODY MASS INDEX: 26.66 KG/M2 | HEIGHT: 65 IN | RESPIRATION RATE: 18 BRPM | DIASTOLIC BLOOD PRESSURE: 83 MMHG | WEIGHT: 160 LBS

## 2024-04-15 DIAGNOSIS — G50.1 ATYPICAL FACIAL PAIN: ICD-10-CM

## 2024-04-15 PROCEDURE — 99204 OFFICE O/P NEW MOD 45 MIN: CPT

## 2024-04-15 PROCEDURE — 99215 OFFICE O/P EST HI 40 MIN: CPT | Mod: NC

## 2024-04-15 RX ORDER — PREGABALIN 75 MG/1
75 CAPSULE ORAL
Refills: 0 | Status: ACTIVE | COMMUNITY

## 2024-04-15 NOTE — HISTORY OF PRESENT ILLNESS
[de-identified] : DMITRIY SIMON  is a 28 year old right handed female with PMH asthma, RIGHT side facial pain, migraines.  She reported that she started experiencing symptoms since 2019. sudden onset, feels like dull and pressure, soreness to the area right face and sometime right posterior head feels warm, loud sounds are muffled associated fullness of the ear, jaw pain and tooth,10/10 and 8/10 tried tylenol, advil, seen a neurologist who thought it was trigeminal neuralgia however, prescribed carbamazepine which exacerbated symptoms. She had botox for TMJ which did not help. She also was given ubrelvy 50mg and nortriptyline 10mg which did not help. Currently take pre-gablin 75mg 3x/day. Patient does not restrict her chewing based off her pain.   PCP/Specialist: Referred by: EMY:

## 2024-04-15 NOTE — PHYSICAL EXAM
[General Appearance - Alert] : alert [General Appearance - In No Acute Distress] : in no acute distress [Oriented To Time, Place, And Person] : oriented to person, place, and time [Impaired Insight] : insight and judgment were intact [Affect] : the affect was normal [Memory Recent] : recent memory was not impaired [Person] : oriented to person [Place] : oriented to place [Time] : oriented to time [Cranial Nerves Optic (II)] : visual acuity intact bilaterally,  pupils equal round and reactive to light [Cranial Nerves Oculomotor (III)] : extraocular motion intact [Cranial Nerves Trigeminal (V)] : facial sensation intact symmetrically [Cranial Nerves Facial (VII)] : face symmetrical [Cranial Nerves Vestibulocochlear (VIII)] : hearing was intact bilaterally [Cranial Nerves Glossopharyngeal (IX)] : tongue and palate midline [Cranial Nerves Accessory (XI - Cranial And Spinal)] : head turning and shoulder shrug symmetric [Cranial Nerves Hypoglossal (XII)] : there was no tongue deviation with protrusion [Motor Strength] : muscle strength was normal in all four extremities [] : no respiratory distress [Abnormal Walk] : normal gait [Over the Past 2 Weeks, Have You Felt Down, Depressed, or Hopeless?] : 1.) Over the past 2 weeks, have you felt down, depressed, or hopeless? No [Over the Past 2 Weeks, Have You Felt Little Interest or Pleasure Doing Things?] : 2.) Over the past 2 weeks, have you felt little interest or pleasure doing things? No [Limited Balance] : balance was intact [Past-pointing] : there was no past-pointing [Tremor] : no tremor present [Dysdiadochokinesia Bilaterally] : not present [Coordination - Dysmetria Impaired Heel-to-Shin Bilateral] : not present

## 2024-04-15 NOTE — ASSESSMENT
[FreeTextEntry1] :  My impression is that the patient suffers from atypical facial pain  I had a long discussion with the patient regarding being evaluated by colleage Dr. Pavon.  The patient was extensively educated about the nature of her disease process. Therapeutic and diagnostic tests include.  The patient should see Dr. Pavon who will follow her prospectively.

## 2024-04-22 NOTE — ASSESSMENT
[FreeTextEntry1] : 28 year old female with right sided facial pain, description suggests atypical facial pain. I discussed the treatments for trigeminal neuralgia including percutaneous rhizotomy, radiosurgery, and microsurgical MVD/neurolysis. I explained that these treatments are less efective for atypical facial pain but can be tried as some patients do get benefit. All treatments have some risk of facial numbness and dysesthesias. Patient favors conservative treatment at this time. Will refer to pain management.    I, Dr. Pavon, personally performed the evaluation and management (E/M) services for this new patient.  That E/M includes conducting the initial examination, assessing all conditions, and establishing the plan of care.  Today, my ACP, Daisy Jerome, was here to observe my evaluation and management services for this patient to be followed going forward.    Patient and patient's family verbalize agreement and understanding with plan of care.

## 2024-04-22 NOTE — HISTORY OF PRESENT ILLNESS
[de-identified] : DMITRIY SIMON  is a 28 year old right handed female with PMH asthma, RIGHT side facial pain, migraines.  She reported that she started experiencing symptoms since 2019. sudden onset, feels like burning to the area right face and sometime right posterior head feels warm, loud sounds are muffled associated fullness of the ear, jaw pain and tooth,10/10 and 8/10 tried tylenol, advil, seen a neurologist who thought it was trigeminal neuralgia however, prescribed carbamazepine which exacerbated symptoms. She had botox for TMJ which did not help. She also was given ubrelvy 50mg and nortriptyline 10mg which did not help. Currently take pre-gablin 75mg 3x/day. Patient does not restrict her chewing based off her pain. She acknowledges "zapping" pain to the right side of her forehead especially while flying but states this pain is not frequent.

## 2024-04-22 NOTE — REASON FOR VISIT
[New Patient Visit] : a new patient visit [Referred By: _________] : Patient was referred by SUSU [FreeTextEntry1] : Facial Pain

## 2025-02-24 ENCOUNTER — APPOINTMENT (OUTPATIENT)
Dept: NEUROLOGY | Facility: CLINIC | Age: 30
End: 2025-02-24

## 2025-04-08 ENCOUNTER — APPOINTMENT (OUTPATIENT)
Dept: PAIN MANAGEMENT | Facility: CLINIC | Age: 30
End: 2025-04-08
Payer: COMMERCIAL

## 2025-04-08 VITALS
SYSTOLIC BLOOD PRESSURE: 134 MMHG | HEIGHT: 65 IN | WEIGHT: 160 LBS | BODY MASS INDEX: 26.66 KG/M2 | HEART RATE: 86 BPM | DIASTOLIC BLOOD PRESSURE: 83 MMHG

## 2025-04-08 PROCEDURE — 99204 OFFICE O/P NEW MOD 45 MIN: CPT

## 2025-04-08 PROCEDURE — 99213 OFFICE O/P EST LOW 20 MIN: CPT

## 2025-04-08 PROCEDURE — 99203 OFFICE O/P NEW LOW 30 MIN: CPT

## 2025-04-08 RX ORDER — DULOXETINE HYDROCHLORIDE 30 MG/1
30 CAPSULE, DELAYED RELEASE PELLETS ORAL
Qty: 30 | Refills: 2 | Status: ACTIVE | COMMUNITY
Start: 2025-04-08 | End: 1900-01-01

## 2025-07-25 ENCOUNTER — APPOINTMENT (OUTPATIENT)
Dept: PAIN MANAGEMENT | Facility: CLINIC | Age: 30
End: 2025-07-25

## (undated) DEVICE — SUT SILK 2-0 30" PSL

## (undated) DEVICE — LIGASURE EXACT DISSECTOR

## (undated) DEVICE — VENODYNE/SCD SLEEVE CALF MEDIUM

## (undated) DEVICE — BIPOLAR FORCEP KIRWAN JEWELERS STR 4" X 0.4MM W 12FT CORD (GREEN)

## (undated) DEVICE — SUT SILK 2-0 12-18"

## (undated) DEVICE — SUT MONOCRYL 3-0 27" PS-2 UNDYED

## (undated) DEVICE — PROBE PRASS SLIM MONOPOLAR STIMULATOR

## (undated) DEVICE — PACK UPPER BODY

## (undated) DEVICE — WARMING BLANKET LOWER ADULT

## (undated) DEVICE — POSITIONER PATIENT SAFETY STRAP 3X60"

## (undated) DEVICE — BLADE SURGICAL #15 CARBON

## (undated) DEVICE — SPONGE PEANUT AUTO COUNT

## (undated) DEVICE — GLV 7.5 PROTEXIS (WHITE)

## (undated) DEVICE — DRAIN SILICONE ROUND 9FR

## (undated) DEVICE — DRAIN RESERVOIR FOR JACKSON PRATT 100CC CARDINAL

## (undated) DEVICE — DRSG TEGADERM 2.5X3"

## (undated) DEVICE — LAP PAD 18 X 18"

## (undated) DEVICE — LONE STAR RETRACTOR RING 12MM BLUNT DISP

## (undated) DEVICE — DRAIN JACKSON PRATT 10MM FLAT 3/4 NO TROCAR

## (undated) DEVICE — SUT PLAIN GUT FAST ABSORBING 5-0 PC-1

## (undated) DEVICE — SUT VICRYL 3-0 27" SH UNDYED